# Patient Record
Sex: MALE | Race: WHITE | NOT HISPANIC OR LATINO | Employment: FULL TIME | URBAN - NONMETROPOLITAN AREA
[De-identification: names, ages, dates, MRNs, and addresses within clinical notes are randomized per-mention and may not be internally consistent; named-entity substitution may affect disease eponyms.]

---

## 2023-08-05 ENCOUNTER — APPOINTMENT (EMERGENCY)
Dept: CT IMAGING | Facility: HOSPITAL | Age: 48
End: 2023-08-05
Payer: COMMERCIAL

## 2023-08-05 ENCOUNTER — HOSPITAL ENCOUNTER (EMERGENCY)
Facility: HOSPITAL | Age: 48
Discharge: HOME | End: 2023-08-05
Attending: STUDENT IN AN ORGANIZED HEALTH CARE EDUCATION/TRAINING PROGRAM
Payer: COMMERCIAL

## 2023-08-05 VITALS
HEIGHT: 72 IN | HEART RATE: 88 BPM | SYSTOLIC BLOOD PRESSURE: 126 MMHG | RESPIRATION RATE: 17 BRPM | WEIGHT: 226.85 LBS | DIASTOLIC BLOOD PRESSURE: 75 MMHG | OXYGEN SATURATION: 97 % | TEMPERATURE: 97 F | BODY MASS INDEX: 30.73 KG/M2

## 2023-08-05 DIAGNOSIS — N13.2 HYDRONEPHROSIS WITH URETERAL CALCULUS: Primary | ICD-10-CM

## 2023-08-05 DIAGNOSIS — R55 SYNCOPE AND COLLAPSE: ICD-10-CM

## 2023-08-05 LAB
ANION GAP SERPL CALCULATED.3IONS-SCNC: 5 MMOL/L
BACTERIA UR QL AUTO: ABNORMAL /HPF
BASOPHILS # BLD AUTO: 0.06 THOUSANDS/ÂΜL (ref 0–0.1)
BASOPHILS NFR BLD AUTO: 1 % (ref 0–1)
BILIRUB UR QL STRIP: NEGATIVE
BUN SERPL-MCNC: 19 MG/DL (ref 5–25)
CALCIUM SERPL-MCNC: 8.9 MG/DL (ref 8.4–10.2)
CHLORIDE SERPL-SCNC: 108 MMOL/L (ref 96–108)
CK SERPL-CCNC: 78 U/L (ref 39–308)
CLARITY UR: CLEAR
CO2 SERPL-SCNC: 27 MMOL/L (ref 21–32)
COLOR UR: YELLOW
CREAT SERPL-MCNC: 1.05 MG/DL (ref 0.6–1.3)
EOSINOPHIL # BLD AUTO: 0.11 THOUSAND/ÂΜL (ref 0–0.61)
EOSINOPHIL NFR BLD AUTO: 1 % (ref 0–6)
ERYTHROCYTE [DISTWIDTH] IN BLOOD BY AUTOMATED COUNT: 12 % (ref 11.6–15.1)
GFR SERPL CREATININE-BSD FRML MDRD: 82 ML/MIN/1.73SQ M
GLUCOSE SERPL-MCNC: 77 MG/DL (ref 65–140)
GLUCOSE UR STRIP-MCNC: ABNORMAL MG/DL
HCT VFR BLD AUTO: 45.8 % (ref 36.5–49.3)
HGB BLD-MCNC: 15.5 G/DL (ref 12–17)
HGB UR QL STRIP.AUTO: ABNORMAL
IMM GRANULOCYTES # BLD AUTO: 0.06 THOUSAND/UL (ref 0–0.2)
IMM GRANULOCYTES NFR BLD AUTO: 1 % (ref 0–2)
KETONES UR STRIP-MCNC: NEGATIVE MG/DL
LACTATE SERPL-SCNC: 1.9 MMOL/L (ref 0.5–2)
LEUKOCYTE ESTERASE UR QL STRIP: NEGATIVE
LYMPHOCYTES # BLD AUTO: 2.01 THOUSANDS/ÂΜL (ref 0.6–4.47)
LYMPHOCYTES NFR BLD AUTO: 23 % (ref 14–44)
MAGNESIUM SERPL-MCNC: 2 MG/DL (ref 1.9–2.7)
MCH RBC QN AUTO: 29.6 PG (ref 26.8–34.3)
MCHC RBC AUTO-ENTMCNC: 33.8 G/DL (ref 31.4–37.4)
MCV RBC AUTO: 88 FL (ref 82–98)
MONOCYTES # BLD AUTO: 1.04 THOUSAND/ÂΜL (ref 0.17–1.22)
MONOCYTES NFR BLD AUTO: 12 % (ref 4–12)
MUCOUS THREADS UR QL AUTO: ABNORMAL
NEUTROPHILS # BLD AUTO: 5.63 THOUSANDS/ÂΜL (ref 1.85–7.62)
NEUTS SEG NFR BLD AUTO: 62 % (ref 43–75)
NITRITE UR QL STRIP: NEGATIVE
NON-SQ EPI CELLS URNS QL MICRO: ABNORMAL /HPF
NRBC BLD AUTO-RTO: 0 /100 WBCS
PH UR STRIP.AUTO: 7 [PH]
PLATELET # BLD AUTO: 211 THOUSANDS/UL (ref 149–390)
PMV BLD AUTO: 9.5 FL (ref 8.9–12.7)
POTASSIUM SERPL-SCNC: 4.5 MMOL/L (ref 3.5–5.3)
PROT UR STRIP-MCNC: NEGATIVE MG/DL
RBC # BLD AUTO: 5.23 MILLION/UL (ref 3.88–5.62)
RBC #/AREA URNS AUTO: ABNORMAL /HPF
SODIUM SERPL-SCNC: 140 MMOL/L (ref 135–147)
SP GR UR STRIP.AUTO: 1.02 (ref 1–1.03)
UROBILINOGEN UR QL STRIP.AUTO: 0.2 E.U./DL
WBC # BLD AUTO: 8.91 THOUSAND/UL (ref 4.31–10.16)
WBC #/AREA URNS AUTO: ABNORMAL /HPF

## 2023-08-05 PROCEDURE — 96375 TX/PRO/DX INJ NEW DRUG ADDON: CPT

## 2023-08-05 PROCEDURE — 85025 COMPLETE CBC W/AUTO DIFF WBC: CPT | Performed by: STUDENT IN AN ORGANIZED HEALTH CARE EDUCATION/TRAINING PROGRAM

## 2023-08-05 PROCEDURE — G1004 CDSM NDSC: HCPCS

## 2023-08-05 PROCEDURE — 99285 EMERGENCY DEPT VISIT HI MDM: CPT | Performed by: STUDENT IN AN ORGANIZED HEALTH CARE EDUCATION/TRAINING PROGRAM

## 2023-08-05 PROCEDURE — 96365 THER/PROPH/DIAG IV INF INIT: CPT

## 2023-08-05 PROCEDURE — 99284 EMERGENCY DEPT VISIT MOD MDM: CPT

## 2023-08-05 PROCEDURE — 83605 ASSAY OF LACTIC ACID: CPT | Performed by: STUDENT IN AN ORGANIZED HEALTH CARE EDUCATION/TRAINING PROGRAM

## 2023-08-05 PROCEDURE — 36415 COLL VENOUS BLD VENIPUNCTURE: CPT | Performed by: STUDENT IN AN ORGANIZED HEALTH CARE EDUCATION/TRAINING PROGRAM

## 2023-08-05 PROCEDURE — 80048 BASIC METABOLIC PNL TOTAL CA: CPT | Performed by: STUDENT IN AN ORGANIZED HEALTH CARE EDUCATION/TRAINING PROGRAM

## 2023-08-05 PROCEDURE — 82550 ASSAY OF CK (CPK): CPT | Performed by: STUDENT IN AN ORGANIZED HEALTH CARE EDUCATION/TRAINING PROGRAM

## 2023-08-05 PROCEDURE — 83735 ASSAY OF MAGNESIUM: CPT | Performed by: STUDENT IN AN ORGANIZED HEALTH CARE EDUCATION/TRAINING PROGRAM

## 2023-08-05 PROCEDURE — 81001 URINALYSIS AUTO W/SCOPE: CPT | Performed by: STUDENT IN AN ORGANIZED HEALTH CARE EDUCATION/TRAINING PROGRAM

## 2023-08-05 PROCEDURE — 74176 CT ABD & PELVIS W/O CONTRAST: CPT

## 2023-08-05 RX ORDER — KETOROLAC TROMETHAMINE 30 MG/ML
15 INJECTION, SOLUTION INTRAMUSCULAR; INTRAVENOUS ONCE
Status: COMPLETED | OUTPATIENT
Start: 2023-08-05 | End: 2023-08-05

## 2023-08-05 RX ORDER — SODIUM CHLORIDE, SODIUM GLUCONATE, SODIUM ACETATE, POTASSIUM CHLORIDE, MAGNESIUM CHLORIDE, SODIUM PHOSPHATE, DIBASIC, AND POTASSIUM PHOSPHATE .53; .5; .37; .037; .03; .012; .00082 G/100ML; G/100ML; G/100ML; G/100ML; G/100ML; G/100ML; G/100ML
1000 INJECTION, SOLUTION INTRAVENOUS ONCE
Status: COMPLETED | OUTPATIENT
Start: 2023-08-05 | End: 2023-08-05

## 2023-08-05 RX ADMIN — SODIUM CHLORIDE, SODIUM GLUCONATE, SODIUM ACETATE, POTASSIUM CHLORIDE, MAGNESIUM CHLORIDE, SODIUM PHOSPHATE, DIBASIC, AND POTASSIUM PHOSPHATE 1000 ML: .53; .5; .37; .037; .03; .012; .00082 INJECTION, SOLUTION INTRAVENOUS at 13:31

## 2023-08-05 RX ADMIN — KETOROLAC TROMETHAMINE 15 MG: 30 INJECTION, SOLUTION INTRAMUSCULAR at 14:04

## 2023-08-05 NOTE — ED PROVIDER NOTES
History  Chief Complaint   Patient presents with   • Syncope     Pt had a syncopal episode while sitting in the car. Pt reports "I was sitting in the hot car and started getting flank pain and started to pass out". Pt arrives alert and oriented. Pt was incontinent during event. History provided by:  Patient and EMS personnel    39-year-old male. History of kidney stones. States he developed left flank/lower back pain a few days ago. Acutely worsening today. Of note, he states that when he develops/experiences severe pain, he will have a syncopal episode with occasional shaking. This afternoon, the patient states that while he was sitting in a hot car, he developed worsening left flank pain and syncopized. The patient denies tongue biting but was incontinent of urine. EMS administered IV fluids, IV Toradol which improved his pain. Currently rates his pain as a 2/10 in severity. No known epileptic seizure history. Does not take AEDs. Past Medical History:   Diagnosis Date   • Syncope      History reviewed. No pertinent surgical history. History reviewed. No pertinent family history. I have reviewed and agree with the history as documented. E-Cigarette/Vaping     E-Cigarette/Vaping Substances     Social History     Tobacco Use   • Smoking status: Former     Types: Cigarettes   • Smokeless tobacco: Never   Substance Use Topics   • Alcohol use: Never   • Drug use: Never       Review of Systems   Constitutional: Negative for activity change, appetite change, chills, diaphoresis, fatigue and fever. HENT: Negative for congestion, rhinorrhea, sinus pressure, sinus pain, sore throat, trouble swallowing and voice change. Eyes: Negative for pain, discharge, redness, itching and visual disturbance. Respiratory: Negative for cough, chest tightness, shortness of breath and wheezing. Cardiovascular: Negative for chest pain, palpitations and leg swelling.    Gastrointestinal: Negative for abdominal distention, abdominal pain, constipation, diarrhea, nausea and vomiting. Genitourinary: Positive for flank pain. Negative for decreased urine volume, difficulty urinating, dysuria, frequency and urgency. Musculoskeletal: Positive for back pain. Negative for arthralgias, myalgias, neck pain and neck stiffness. Skin: Negative for color change, pallor, rash and wound. Neurological: Positive for dizziness, seizures and syncope. Negative for facial asymmetry, speech difficulty, weakness, light-headedness, numbness and headaches. Hematological: Does not bruise/bleed easily. Psychiatric/Behavioral: Negative for agitation, confusion, decreased concentration and sleep disturbance. The patient is not nervous/anxious. All other systems reviewed and are negative. Physical Exam  Physical Exam  Vitals and nursing note reviewed. Constitutional:       General: He is not in acute distress. Appearance: He is not ill-appearing or toxic-appearing. HENT:      Head: Normocephalic and atraumatic. Right Ear: External ear normal.      Left Ear: External ear normal.      Nose: No congestion or rhinorrhea. Mouth/Throat:      Mouth: Mucous membranes are moist.      Pharynx: Oropharynx is clear. No oropharyngeal exudate or posterior oropharyngeal erythema. Comments: No signs of tongue biting  Eyes:      General:         Right eye: No discharge. Left eye: No discharge. Extraocular Movements: Extraocular movements intact. Conjunctiva/sclera: Conjunctivae normal.   Cardiovascular:      Rate and Rhythm: Normal rate and regular rhythm. Pulses: Normal pulses. Heart sounds: Normal heart sounds. No murmur heard. Pulmonary:      Effort: Pulmonary effort is normal. No respiratory distress. Breath sounds: Normal breath sounds. No stridor. No wheezing, rhonchi or rales. Chest:      Chest wall: No tenderness. Abdominal:      General: Abdomen is flat.  Bowel sounds are normal. There is no distension. Palpations: Abdomen is soft. There is no mass. Tenderness: There is no abdominal tenderness. There is no right CVA tenderness, left CVA tenderness, guarding or rebound. Hernia: No hernia is present. Genitourinary:     Comments: Incontinent of urine  Musculoskeletal:         General: Tenderness present. No swelling, deformity or signs of injury. Cervical back: Neck supple. No tenderness. Right lower leg: No edema. Left lower leg: No edema. Comments: Mild tenderness to palpation along the left lower back. No CVA tenderness. Skin:     General: Skin is warm and dry. Capillary Refill: Capillary refill takes less than 2 seconds. Coloration: Skin is not jaundiced or pale. Findings: No bruising, erythema or rash. Neurological:      General: No focal deficit present. Mental Status: He is alert and oriented to person, place, and time. Cranial Nerves: No cranial nerve deficit. Sensory: No sensory deficit. Motor: No weakness. Coordination: Coordination normal.   Psychiatric:         Mood and Affect: Mood normal.         Behavior: Behavior normal.         Thought Content:  Thought content normal.         Judgment: Judgment normal.       Vital Signs  ED Triage Vitals [08/05/23 1328]   Temperature Pulse Respirations Blood Pressure SpO2   (!) 97 °F (36.1 °C) 92 16 131/75 98 %      Temp Source Heart Rate Source Patient Position - Orthostatic VS BP Location FiO2 (%)   Temporal Monitor Sitting Right arm --      Pain Score       2           Vitals:    08/05/23 1328 08/05/23 1430   BP: 131/75 126/75   Pulse: 92 88   Patient Position - Orthostatic VS: Sitting          Visual Acuity  Visual Acuity    Flowsheet Row Most Recent Value   L Pupil Size (mm) 3   R Pupil Size (mm) 3          ED Medications  Medications   multi-electrolyte (ISOLYTE-S PH 7.4) bolus 1,000 mL (0 mL Intravenous Stopped 8/5/23 1451)   ketorolac (TORADOL) injection 15 mg (15 mg Intravenous Given 8/5/23 1404)       Diagnostic Studies  Results Reviewed     Procedure Component Value Units Date/Time    Urine Microscopic [[id number]]  (Abnormal) Collected: 08/05/23 1511    Lab Status: Final result Specimen: Urine, Clean Catch Updated: 08/05/23 1534     RBC, UA 4-10 /hpf      WBC, UA None Seen /hpf      Epithelial Cells Occasional /hpf      Bacteria, UA Occasional /hpf      MUCUS THREADS Occasional    UA w Reflex to Microscopic w Reflex to Culture [[id number]]  (Abnormal) Collected: 08/05/23 1511    Lab Status: Final result Specimen: Urine, Clean Catch Updated: 08/05/23 1515     Color, UA Yellow     Clarity, UA Clear     Specific Gravity, UA 1.025     pH, UA 7.0     Leukocytes, UA Negative     Nitrite, UA Negative     Protein, UA Negative mg/dl      Glucose,  (1/10%) mg/dl      Ketones, UA Negative mg/dl      Urobilinogen, UA 0.2 E.U./dl      Bilirubin, UA Negative     Occult Blood, UA Moderate    Basic metabolic panel [[id number]] Collected: 08/05/23 1332    Lab Status: Final result Specimen: Blood from Arm, Left Updated: 08/05/23 1354     Sodium 140 mmol/L      Potassium 4.5 mmol/L      Chloride 108 mmol/L      CO2 27 mmol/L      ANION GAP 5 mmol/L      BUN 19 mg/dL      Creatinine 1.05 mg/dL      Glucose 77 mg/dL      Calcium 8.9 mg/dL      eGFR 82 ml/min/1.73sq m     Narrative:      [facility] guidelines for Chronic Kidney Disease (CKD):   •  Stage 1 with normal or high GFR (GFR > 90 mL/min/1.73 square meters)  •  Stage 2 Mild CKD (GFR = 60-89 mL/min/1.73 square meters)  •  Stage 3A Moderate CKD (GFR = 45-59 mL/min/1.73 square meters)  •  Stage 3B Moderate CKD (GFR = 30-44 mL/min/1.73 square meters)  •  Stage 4 Severe CKD (GFR = 15-29 mL/min/1.73 square meters)  •  Stage 5 End Stage CKD (GFR <15 mL/min/1.73 square meters)  Note: GFR calculation is accurate only with a steady state creatinine    CK [[id number]]  (Normal) Collected: 08/05/23 1332    Lab Status: Final result Specimen: Blood from Arm, Left Updated: 08/05/23 1354     Total CK 78 U/L     Magnesium [[id number]]  (Normal) Collected: 08/05/23 1332    Lab Status: Final result Specimen: Blood from Arm, Left Updated: 08/05/23 1354     Magnesium 2.0 mg/dL     Lactic acid, plasma (w/reflex if result > 2.0) [[id number]]  (Normal) Collected: 08/05/23 1332    Lab Status: Final result Specimen: Blood from Arm, Left Updated: 08/05/23 1353     LACTIC ACID 1.9 mmol/L     Narrative:      Result may be elevated if tourniquet was used during collection. CBC and differential [[id number]] Collected: 08/05/23 1332    Lab Status: Final result Specimen: Blood from Arm, Left Updated: 08/05/23 1336     WBC 8.91 Thousand/uL      RBC 5.23 Million/uL      Hemoglobin 15.5 g/dL      Hematocrit 45.8 %      MCV 88 fL      MCH 29.6 pg      MCHC 33.8 g/dL      RDW 12.0 %      MPV 9.5 fL      Platelets 777 Thousands/uL      nRBC 0 /100 WBCs      Neutrophils Relative 62 %      Immat GRANS % 1 %      Lymphocytes Relative 23 %      Monocytes Relative 12 %      Eosinophils Relative 1 %      Basophils Relative 1 %      Neutrophils Absolute 5.63 Thousands/µL      Immature Grans Absolute 0.06 Thousand/uL      Lymphocytes Absolute 2.01 Thousands/µL      Monocytes Absolute 1.04 Thousand/µL      Eosinophils Absolute 0.11 Thousand/µL      Basophils Absolute 0.06 Thousands/µL                  CT renal stone study abdomen pelvis wo contrast   Final Result by Thomas Wang MD (08/05 1522)      1. Mild left hydronephrosis secondary to a 3 mm proximal left ureteral calculus. 2.  Hepatic steatosis. The study was marked in [facility] for immediate notification. Workstation performed: [id number]                    Procedures  Procedures         ED Course  ED Course as of 08/06/23 1505   Sat Aug 05, 2023   1400 No leukocytosis. Hemoglobin is within normal limits. No significant abnormalities noted on BMP.   Renal function is within normal limits. Lactic acid/CK negative. 1529 CT imaging showing a 3 mm proximal left ureteral stent. Mild hydronephrosis. SBIRT 20yo+    Flowsheet Row Most Recent Value   Initial Alcohol Screen: US AUDIT-C     1. How often do you have a drink containing alcohol? 0 Filed at: 08/05/2023 1328   2. How many drinks containing alcohol do you have on a typical day you are drinking? 0 Filed at: 08/05/2023 1328   3a. Male UNDER 65: How often do you have five or more drinks on one occasion? 0 Filed at: 08/05/2023 1328   3b. FEMALE Any Age, or MALE 65+: How often do you have 4 or more drinks on one occassion? 0 Filed at: 08/05/2023 1328   Audit-C Score 0 Filed at: 08/05/2023 1328   DEEPTI: How many times in the past year have you. .. Used an illegal drug or used a prescription medication for non-medical reasons? Never Filed at: 08/05/2023 1328                    Medical Decision Making  The differential diagnoses include but are not limited to syncope, ureteral calculus, sepsis, UTI  Vital signs reviewed. AAOx3. No focal neurological deficits. The patient/family/EMS denies post ictal period s/p seizure like activity/syncopal episode. Brief episode of shaking/seizure like episode in the setting of syncope likely myoclonus rather than epileptic seizure. LA/CK are within normal limits. No significant laboratory abnormalities. CT imaging interpretation above. Pain improved with IV Toradol. No subsequent episodes of syncope or seizure like activity. Hydronephrosis with ureteral calculus: acute illness or injury  Syncope and collapse: acute illness or injury  Amount and/or Complexity of Data Reviewed  Labs: ordered. Decision-making details documented in ED Course. Radiology: ordered. Decision-making details documented in ED Course. Risk  Prescription drug management.           Disposition  Final diagnoses:   Hydronephrosis with ureteral calculus   Syncope and collapse     Time reflects when diagnosis was documented in both MDM as applicable and the Disposition within this note     Time User Action Codes Description Comment    8/5/2023  3:50 PM Matthew Odon Add [N20.1] Ureteral calculus, left     8/5/2023  3:50 PM IjeomaAdam guzman Remove [N20.1] Ureteral calculus, left     8/5/2023  3:50 PM Matthew Odon Add [N13.2] Hydronephrosis with ureteral calculus     8/5/2023  3:50 PM Matthew Odon Add [R55] Syncope and collapse       ED Disposition     ED Disposition   Discharge    Condition   Stable    Date/Time   Sat Aug 5, 2023  3:49 PM    Comment   Severo Frohlich discharge to home/self care. Follow-up Information    None         There are no discharge medications for this patient. No discharge procedures on file.     PDMP Review     None          ED Provider  Electronically Signed by           Lois Argueta DO  08/06/23 7758

## 2023-08-05 NOTE — DISCHARGE INSTRUCTIONS
The laboratory studies that were obtained did not show any significant abnormalities. There is a 3 mm left ureteral calculus. Continue the Flomax. For pain, you can take ibuprofen 600 mg every 6 hours and Tylenol 1000 mg every 6 hours. Follow-up with your urologist.    Also follow-up with your primary care provider regarding the syncope. Do not hesitate to be reevaluated in the ED for any concerning signs or symptoms.